# Patient Record
Sex: MALE
[De-identification: names, ages, dates, MRNs, and addresses within clinical notes are randomized per-mention and may not be internally consistent; named-entity substitution may affect disease eponyms.]

---

## 2022-04-07 ENCOUNTER — APPOINTMENT (OUTPATIENT)
Dept: UROLOGY | Facility: CLINIC | Age: 62
End: 2022-04-07
Payer: COMMERCIAL

## 2022-04-07 DIAGNOSIS — F17.210 NICOTINE DEPENDENCE, CIGARETTES, UNCOMPLICATED: ICD-10-CM

## 2022-04-07 DIAGNOSIS — Z87.2 PERSONAL HISTORY OF DISEASES OF THE SKIN AND SUBCUTANEOUS TISSUE: ICD-10-CM

## 2022-04-07 PROBLEM — Z00.00 ENCOUNTER FOR PREVENTIVE HEALTH EXAMINATION: Status: ACTIVE | Noted: 2022-04-07

## 2022-04-07 PROCEDURE — 99204 OFFICE O/P NEW MOD 45 MIN: CPT

## 2022-04-07 NOTE — ASSESSMENT
[FreeTextEntry1] : We had a long discussion regarding PSA level. Different parameters including PSA velocity, age-adjusted PSA, free PSA, etc reviewed. Some studies have indicated that the absolute PSA level may be more accurate than the other parameters listed above. We discussed observation and repeat PSA, prostate biopsy and prostate or pelvic MRI. The false negative rate of MRI was discussed. Risks of biopsy discussed included but were not limited to bleeding, sepsis, bacteremia, urinary tract infection, erectile dysfunction, etc. How the procedure is performed and preparation with antibiotics and enema were discussed. Risks of observation and not proceeding with biopsy were reviewed. Patient was made aware that prostate cancer can exist at any PSA level. Potential complications of delayed prostate cancer diagnosis were discussed.\par will get mri to eval for targetable lesion possible fusion bx

## 2022-04-07 NOTE — HISTORY OF PRESENT ILLNESS
[FreeTextEntry1] : Elevated PSA \par Patient is here with an elevated PSA. He has no personal history and no family history of prostate cancer.He has no prior genitourinary history of hematuria, hematospermia, prostatitis, UTI,, urolithiasis, epididymal orchitis. \par C/o frequency nocturia x3 slow flow but empties well \par No dysuria or hematuria\par erections ok but not active/interested\par psa 9.75\par

## 2022-05-18 ENCOUNTER — APPOINTMENT (OUTPATIENT)
Dept: UROLOGY | Facility: CLINIC | Age: 62
End: 2022-05-18
Payer: COMMERCIAL

## 2022-05-18 PROCEDURE — 99213 OFFICE O/P EST LOW 20 MIN: CPT

## 2022-05-31 NOTE — HISTORY OF PRESENT ILLNESS
[FreeTextEntry1] : Elevated PSA \par Patient is here with an elevated PSA. He has no personal history and no family history of prostate cancer.He has no prior genitourinary history of hematuria, hematospermia, prostatitis, UTI,, urolithiasis, epididymal orchitis. \par C/o frequency nocturia x3 slow flow but empties well \par No dysuria or hematuria\par erections ok but not active/interested\par psa 9.75\par \par \par mri pirad 1 99 cc prostate

## 2022-05-31 NOTE — ASSESSMENT
[FreeTextEntry1] : mri reviewed \par pirad 1 99 cc prostate \par clinically significant prostate very unlikley \par low psa density as well\par will observe

## 2024-05-17 ENCOUNTER — APPOINTMENT (OUTPATIENT)
Dept: UROLOGY | Facility: CLINIC | Age: 64
End: 2024-05-17
Payer: MEDICARE

## 2024-05-17 VITALS
WEIGHT: 165 LBS | HEART RATE: 67 BPM | DIASTOLIC BLOOD PRESSURE: 80 MMHG | BODY MASS INDEX: 23.62 KG/M2 | OXYGEN SATURATION: 95 % | HEIGHT: 70 IN | TEMPERATURE: 97.4 F | SYSTOLIC BLOOD PRESSURE: 150 MMHG

## 2024-05-17 DIAGNOSIS — R97.20 ELEVATED PROSTATE, SPECIFIC ANTIGEN [PSA]: ICD-10-CM

## 2024-05-17 PROCEDURE — 99204 OFFICE O/P NEW MOD 45 MIN: CPT

## 2024-05-20 PROBLEM — R97.20 ELEVATED PSA: Status: ACTIVE | Noted: 2022-04-07

## 2024-05-20 LAB
ANION GAP SERPL CALC-SCNC: 12 MMOL/L
APPEARANCE: CLEAR
BACTERIA UR CULT: NORMAL
BACTERIA: NEGATIVE /HPF
BILIRUBIN URINE: NEGATIVE
BLOOD URINE: NEGATIVE
BUN SERPL-MCNC: 18 MG/DL
CALCIUM SERPL-MCNC: 9.3 MG/DL
CAST: 1 /LPF
CHLORIDE SERPL-SCNC: 106 MMOL/L
CO2 SERPL-SCNC: 27 MMOL/L
COLOR: YELLOW
CREAT SERPL-MCNC: 1.11 MG/DL
EGFR: 73 ML/MIN/1.73M2
EPITHELIAL CELLS: 5 /HPF
GLUCOSE QUALITATIVE U: NEGATIVE MG/DL
GLUCOSE SERPL-MCNC: 86 MG/DL
KETONES URINE: NEGATIVE MG/DL
LEUKOCYTE ESTERASE URINE: NEGATIVE
MICROSCOPIC-UA: NORMAL
NITRITE URINE: NEGATIVE
PH URINE: 5.5
POTASSIUM SERPL-SCNC: 4.5 MMOL/L
PROTEIN URINE: NEGATIVE MG/DL
PSA SERPL-MCNC: 8.83 NG/ML
RED BLOOD CELLS URINE: 1 /HPF
SODIUM SERPL-SCNC: 145 MMOL/L
SPECIFIC GRAVITY URINE: 1.02
UROBILINOGEN URINE: 0.2 MG/DL
WHITE BLOOD CELLS URINE: 5 /HPF

## 2024-05-20 NOTE — HISTORY OF PRESENT ILLNESS
[FreeTextEntry1] : 05/18/2022 Elevated PSA  Patient is here with an elevated PSA. He has no personal history and no family history of prostate cancer.He has no prior genitourinary history of hematuria, hematospermia, prostatitis, UTI,, urolithiasis, epididymal orchitis.  C/o frequency nocturia x3 slow flow but empties well  No dysuria or hematuria  erections ok but not active/interested  psa 9.75  mri pirad 1 99 cc prostate   05/17/2024 cc hospital f/u   64 year old male presents for a hospital f/u. Pt reports he was in the hospital on 05/16/24 for severe pain in lower back. He said he had a CT scan and the pain completely resolved after scan was done. Pt reports he is not taking any medication and hasn't picked up medication from hospital. He denies having bloodwork done or a catheter inserted at the hospital. Pt reports he was told that his urine is backed up into his kidneys. He denies having anything to drink since hospital visit. Pt reports frequency but denies any other problems with urination.  05/16/24 Abdominal and Pelvic CT Conclusion: Prostate hypertrophy and likely bladder outlet obstruction with distended bladder and right greater than left hydroureteronephrosis. Ascending infection on the right is possible with this appearance.

## 2024-05-20 NOTE — END OF VISIT
[FreeTextEntry4] : This note was written by Janet Lynne on 05/17/2024 actively solely Juvencio Mercer M.D. I, Janet Lynne, am scribing for and in the presence of Juvencio Mercer M.D. in the following sections HISTORY OF PRESENT ILLNESS, PAST MEDICAL/FAMILY/SOCIAL HISTORY; REVIEW OF SYSTEMS; VITAL SIGNS; PHYSICAL EXAM; ASSESSMENT/PLAN.   All medical record entries made by this scribe at my, Juvencio Mercer M.D. direction and personally dictated by me on 05/17/2024. I personally performed the services described in the documentation, reviewed the documentation recorded by the scribe in my presence, and it accurately and completely records my words and actions.

## 2024-05-20 NOTE — ASSESSMENT
[FreeTextEntry1] : 64 year old male with hospital f/u. Reviewed CT images with pt. Prostate hypertrophy and likely bladder outlet obstruction with distended bladder and right greater than left hydroureteronephrosis. Ascending infection on the right is possible with this appearance.  Informed pt he has a distended bladder. Advised pt to start Tamsulosin.  PVR done today. Residual 300 cc. PSA and BMP drawn today. Urinalysis and culture done today. Rx for Tamsulosin given today. RTO in 1 week to assess bladder and kidneys.

## 2024-05-24 ENCOUNTER — APPOINTMENT (OUTPATIENT)
Dept: UROLOGY | Facility: CLINIC | Age: 64
End: 2024-05-24
Payer: MEDICARE

## 2024-05-24 VITALS
OXYGEN SATURATION: 95 % | HEIGHT: 70 IN | DIASTOLIC BLOOD PRESSURE: 85 MMHG | WEIGHT: 165 LBS | TEMPERATURE: 97.3 F | SYSTOLIC BLOOD PRESSURE: 135 MMHG | HEART RATE: 82 BPM | BODY MASS INDEX: 23.62 KG/M2

## 2024-05-24 DIAGNOSIS — N40.1 BENIGN PROSTATIC HYPERPLASIA WITH LOWER URINARY TRACT SYMPMS: ICD-10-CM

## 2024-05-24 DIAGNOSIS — N13.8 BENIGN PROSTATIC HYPERPLASIA WITH LOWER URINARY TRACT SYMPMS: ICD-10-CM

## 2024-05-24 PROCEDURE — 76775 US EXAM ABDO BACK WALL LIM: CPT

## 2024-05-24 PROCEDURE — 99213 OFFICE O/P EST LOW 20 MIN: CPT

## 2024-05-30 PROBLEM — N40.1 BPH WITH URINARY OBSTRUCTION: Status: ACTIVE | Noted: 2024-05-17

## 2024-05-30 NOTE — HISTORY OF PRESENT ILLNESS
[FreeTextEntry1] : 05/18/2022 Elevated PSA  Patient is here with an elevated PSA. He has no personal history and no family history of prostate cancer.He has no prior genitourinary history of hematuria, hematospermia, prostatitis, UTI,, urolithiasis, epididymal orchitis.  C/o frequency nocturia x3 slow flow but empties well  No dysuria or hematuria  erections ok but not active/interested  psa 9.75  mri pirad 1 99 cc prostate   05/17/2024 cc hospital f/u  64 year old male presents for a hospital f/u. Pt reports he was in the hospital on 05/16/24 for severe pain in lower back. He said he had a CT scan and the pain completely resolved after scan was done. Pt reports he is not taking any medication and hasn't picked up medication from hospital. He denies having bloodwork done or a catheter inserted at the hospital. Pt reports he was told that his urine is backed up into his kidneys. He denies having anything to drink since hospital visit. Pt reports frequency but denies any other problems with urination.  05/16/24 Abdominal and Pelvic CT Conclusion: Prostate hypertrophy and likely bladder outlet obstruction with distended bladder and right greater than left hydroureteronephrosis. Ascending infection on the right is possible with this appearance.   05/24/2024 cc review U/S results  64 year old male presents to review U/S results. Pt is taking Tamsulosin and states his sx have improved.    05/24/24 Renal U/S Findings: Right kidney demonstrates mild hydronephrosis. Left kidney presents with mild pelvic fullness. Both kidneys are normal in size and echogenicity without stones or solid masses visualized.

## 2024-05-30 NOTE — END OF VISIT
[FreeTextEntry4] : This note was written by Janet Lynne on 05/24/2024 actively solely Juvencio Mercer M.D. I, Janet Lynne, am scribing for and in the presence of Juvencio Mercer M.D. in the following sections HISTORY OF PRESENT ILLNESS, PAST MEDICAL/FAMILY/SOCIAL HISTORY; REVIEW OF SYSTEMS; VITAL SIGNS; PHYSICAL EXAM; ASSESSMENT/PLAN.   All medical record entries made by this scribe at my, Juvencio Mercer M.D. direction and personally dictated by me on 05/24/2024. I personally performed the services described in the documentation, reviewed the documentation recorded by the scribe in my presence, and it accurately and completely records my words and actions.

## 2024-05-30 NOTE — ASSESSMENT
[FreeTextEntry1] : 64 year old male with review of U/S results. Reviewed 05/24/24 Renal U/S images with pt. Right kidney demonstrates mild hydronephrosis. Left kidney presents with mild pelvic fullness. Both kidneys are normal in size and echogenicity without stones or solid masses visualized.   Informed pt his bladder is still distended but it has gotten a little better. Also informed pt his kidney functions are fine. Discussed possibility of outlet procedure of prostate and possible risks if nothing is done, including kidney function worsening. Also discussed adding Finasteride and seeing if that helps while considering surgery. Pt expressed concerns of prostate procedure. Reviewed prostate surgery and possible risks, including decreased ejaculation. Advised pt to strongly consider procedure and in the meantime start Finasteride. Reviewed Finasteride and possible side effects.   PVR done today. Residual 300 cc. Will continue Tamsulosin and start Finasteride. Rx for Finasteride given today. RTO in 1 month for follow up visit.

## 2024-06-04 ENCOUNTER — TRANSCRIPTION ENCOUNTER (OUTPATIENT)
Age: 64
End: 2024-06-04

## 2024-06-26 ENCOUNTER — APPOINTMENT (OUTPATIENT)
Dept: UROLOGY | Facility: CLINIC | Age: 64
End: 2024-06-26

## 2024-06-26 VITALS
HEIGHT: 70 IN | BODY MASS INDEX: 24.34 KG/M2 | OXYGEN SATURATION: 95 % | SYSTOLIC BLOOD PRESSURE: 119 MMHG | HEART RATE: 78 BPM | WEIGHT: 170 LBS | TEMPERATURE: 97.1 F | DIASTOLIC BLOOD PRESSURE: 79 MMHG

## 2024-06-26 LAB
ANION GAP SERPL CALC-SCNC: 10 MMOL/L
BUN SERPL-MCNC: 14 MG/DL
CALCIUM SERPL-MCNC: 9.5 MG/DL
CHLORIDE SERPL-SCNC: 103 MMOL/L
CO2 SERPL-SCNC: 27 MMOL/L
CREAT SERPL-MCNC: 0.86 MG/DL
EGFR: 96 ML/MIN/1.73M2
GLUCOSE SERPL-MCNC: 84 MG/DL
POTASSIUM SERPL-SCNC: 4.6 MMOL/L
SODIUM SERPL-SCNC: 141 MMOL/L

## 2024-06-26 PROCEDURE — 99213 OFFICE O/P EST LOW 20 MIN: CPT

## 2024-06-26 PROCEDURE — G2211 COMPLEX E/M VISIT ADD ON: CPT

## 2024-08-23 ENCOUNTER — RX RENEWAL (OUTPATIENT)
Age: 64
End: 2024-08-23

## 2024-10-02 ENCOUNTER — APPOINTMENT (OUTPATIENT)
Dept: UROLOGY | Facility: CLINIC | Age: 64
End: 2024-10-02
Payer: MEDICARE

## 2024-10-02 VITALS
OXYGEN SATURATION: 96 % | TEMPERATURE: 96.8 F | HEART RATE: 67 BPM | DIASTOLIC BLOOD PRESSURE: 83 MMHG | BODY MASS INDEX: 24.34 KG/M2 | WEIGHT: 170 LBS | HEIGHT: 70 IN | SYSTOLIC BLOOD PRESSURE: 125 MMHG

## 2024-10-02 DIAGNOSIS — N13.8 BENIGN PROSTATIC HYPERPLASIA WITH LOWER URINARY TRACT SYMPMS: ICD-10-CM

## 2024-10-02 DIAGNOSIS — N39.44 NOCTURNAL ENURESIS: ICD-10-CM

## 2024-10-02 DIAGNOSIS — R97.20 ELEVATED PROSTATE, SPECIFIC ANTIGEN [PSA]: ICD-10-CM

## 2024-10-02 DIAGNOSIS — N40.1 BENIGN PROSTATIC HYPERPLASIA WITH LOWER URINARY TRACT SYMPMS: ICD-10-CM

## 2024-10-02 PROCEDURE — 99214 OFFICE O/P EST MOD 30 MIN: CPT

## 2024-10-02 PROCEDURE — G2211 COMPLEX E/M VISIT ADD ON: CPT

## 2024-10-02 PROCEDURE — 51798 US URINE CAPACITY MEASURE: CPT

## 2024-10-07 PROBLEM — N39.44 PRIMARY NOCTURNAL ENURESIS: Status: ACTIVE | Noted: 2024-10-07

## 2024-10-07 LAB
ANION GAP SERPL CALC-SCNC: 15 MMOL/L
APPEARANCE: CLEAR
BACTERIA UR CULT: NORMAL
BACTERIA: NEGATIVE /HPF
BILIRUBIN URINE: NEGATIVE
BLOOD URINE: NEGATIVE
BUN SERPL-MCNC: 11 MG/DL
CALCIUM SERPL-MCNC: 9.5 MG/DL
CAST: 1 /LPF
CHLORIDE SERPL-SCNC: 102 MMOL/L
CO2 SERPL-SCNC: 22 MMOL/L
COLOR: YELLOW
CREAT SERPL-MCNC: 0.87 MG/DL
EGFR: 95 ML/MIN/1.73M2
EPITHELIAL CELLS: 0 /HPF
GLUCOSE QUALITATIVE U: NEGATIVE MG/DL
GLUCOSE SERPL-MCNC: 90 MG/DL
KETONES URINE: NEGATIVE MG/DL
LEUKOCYTE ESTERASE URINE: NEGATIVE
MICROSCOPIC-UA: NORMAL
NITRITE URINE: NEGATIVE
PH URINE: 6
POTASSIUM SERPL-SCNC: 4.7 MMOL/L
PROTEIN URINE: NEGATIVE MG/DL
PSA SERPL-MCNC: 8.19 NG/ML
RED BLOOD CELLS URINE: 0 /HPF
REVIEW: NORMAL
SODIUM SERPL-SCNC: 140 MMOL/L
SPECIFIC GRAVITY URINE: 1.02
UROBILINOGEN URINE: 0.2 MG/DL
WHITE BLOOD CELLS URINE: 0 /HPF

## 2024-10-07 NOTE — END OF VISIT
[FreeTextEntry4] : This note was written by Genesis Verdin on 10/02/2024 actively solely Juvencio Mercer M.D. I, Genesis Verdin, am scribing for and in the presence of Juvencio Mercer M.D. in the following sections HISTORY OF PRESENT ILLNESS, PAST MEDICAL/FAMILY/SOCIAL HISTORY; REVIEW OF SYSTEMS; VITAL SIGNS; PHYSICAL EXAM; ASSESSMENT/PLAN. All medical record entries made by this scribe at , Juvencio Mercer M.D. direction and personally dictated by me on 10/02/2024. I personally performed the services described in the documentation, reviewed the documentation recorded by the scribe in my presence, and it accurately and completely records my words and actions.

## 2024-10-07 NOTE — HISTORY OF PRESENT ILLNESS
[FreeTextEntry1] : 05/18/2022 Elevated PSA  Patient is here with an elevated PSA. He has no personal history and no family history of prostate cancer.He has no prior genitourinary history of hematuria, hematospermia, prostatitis, UTI,, urolithiasis, epididymal orchitis.  C/o frequency nocturia x3 slow flow but empties well No dysuria or hematuria erections ok but not active/interested psa 9.75 mri pirad 1 99 cc prostate   05/17/2024 cc hospital f/u  64 year old male presents for a hospital f/u. Pt reports he was in the hospital on 05/16/24 for severe pain in lower back. He said he had a CT scan and the pain completely resolved after scan was done. Pt reports he is not taking any medication and hasn't picked up medication from hospital. He denies having bloodwork done or a catheter inserted at the hospital. Pt reports he was told that his urine is backed up into his kidneys. He denies having anything to drink since hospital visit. Pt reports frequency but denies any other problems with urination.  05/16/24 Abdominal and Pelvic CT Conclusion: Prostate hypertrophy and likely bladder outlet obstruction with distended bladder and right greater than left hydroureteronephrosis. Ascending infection on the right is possible with this appearance.   05/24/2024 cc review U/S results  64 year old male presents to review U/S results. Pt is taking Tamsulosin and states his sx have improved.  05/24/24 Renal U/S Findings: Right kidney demonstrates mild hydronephrosis. Left kidney presents with mild pelvic fullness. Both kidneys are normal in size and echogenicity without stones or solid masses visualized.  06/26/2024 cc:f/u visit  64 year old male presents with f/u visit. He reports his urination is better and the flow is more?. He reports he went to the hospital because of kidney pain, vomiting wasn't able to keep still and was experiencing pain. He states the hospital said the urine was backing up into the kidney was given morphine. He states the morphine helped and after taken for the MRI the pain was gone and was told to go see a urologist.  10/02/2024 cc: f/u visit with c/o nocturnal enuresis  Pt is a 64 year old male presenting for a f/u visit with c/o nocturnal enuresis. Pt reports that within the last week (09/22/2024-09/28/2024) he has urinated during his sleep around 2-3 times. Pt denies any other urinary symptoms. Pt requested refills for his medications.

## 2024-10-07 NOTE — ASSESSMENT
[FreeTextEntry1] : Pt is a 64 year old male with a f/u visit. UA and culture done today  pvr slightlly improved 250  cont finasteride and tamsulosin  reduce fluid and caffeine in evening  Rx Finasteride  Rx Tamsulosin  repeat psa  if rising will get mri to eval for fusion target bc     Patient is being seen today for evaluation and management of a chronic and longitudinal ongoing condition and I am of the primary treating physician.

## 2024-10-28 ENCOUNTER — RX RENEWAL (OUTPATIENT)
Age: 64
End: 2024-10-28

## 2025-04-09 ENCOUNTER — APPOINTMENT (OUTPATIENT)
Dept: UROLOGY | Facility: CLINIC | Age: 65
End: 2025-04-09
Payer: MEDICARE

## 2025-04-09 VITALS
OXYGEN SATURATION: 95 % | SYSTOLIC BLOOD PRESSURE: 125 MMHG | TEMPERATURE: 98.8 F | HEIGHT: 70 IN | HEART RATE: 103 BPM | WEIGHT: 170 LBS | DIASTOLIC BLOOD PRESSURE: 82 MMHG | BODY MASS INDEX: 24.34 KG/M2

## 2025-04-09 DIAGNOSIS — N40.1 BENIGN PROSTATIC HYPERPLASIA WITH LOWER URINARY TRACT SYMPMS: ICD-10-CM

## 2025-04-09 DIAGNOSIS — R97.20 ELEVATED PROSTATE, SPECIFIC ANTIGEN [PSA]: ICD-10-CM

## 2025-04-09 DIAGNOSIS — N13.8 BENIGN PROSTATIC HYPERPLASIA WITH LOWER URINARY TRACT SYMPMS: ICD-10-CM

## 2025-04-09 PROCEDURE — 99214 OFFICE O/P EST MOD 30 MIN: CPT

## 2025-04-09 PROCEDURE — 51798 US URINE CAPACITY MEASURE: CPT

## 2025-04-09 PROCEDURE — G2211 COMPLEX E/M VISIT ADD ON: CPT

## 2025-04-10 LAB
ANION GAP SERPL CALC-SCNC: 12 MMOL/L
APPEARANCE: CLEAR
BACTERIA: NEGATIVE /HPF
BILIRUBIN URINE: NEGATIVE
BLOOD URINE: NEGATIVE
BUN SERPL-MCNC: 14 MG/DL
CALCIUM SERPL-MCNC: 9.3 MG/DL
CAST: 0 /LPF
CHLORIDE SERPL-SCNC: 102 MMOL/L
CO2 SERPL-SCNC: 26 MMOL/L
COLOR: YELLOW
CREAT SERPL-MCNC: 1 MG/DL
EGFRCR SERPLBLD CKD-EPI 2021: 82 ML/MIN/1.73M2
EPITHELIAL CELLS: 0 /HPF
GLUCOSE QUALITATIVE U: NEGATIVE MG/DL
GLUCOSE SERPL-MCNC: 95 MG/DL
KETONES URINE: NEGATIVE MG/DL
LEUKOCYTE ESTERASE URINE: NEGATIVE
MICROSCOPIC-UA: NORMAL
NITRITE URINE: NEGATIVE
PH URINE: 6
POTASSIUM SERPL-SCNC: 4.7 MMOL/L
PROTEIN URINE: NEGATIVE MG/DL
PSA SERPL-MCNC: 8.37 NG/ML
RED BLOOD CELLS URINE: 0 /HPF
SODIUM SERPL-SCNC: 141 MMOL/L
SPECIFIC GRAVITY URINE: 1.02
UROBILINOGEN URINE: 0.2 MG/DL
WHITE BLOOD CELLS URINE: 1 /HPF

## 2025-04-11 LAB — BACTERIA UR CULT: NORMAL

## 2025-06-30 ENCOUNTER — RX RENEWAL (OUTPATIENT)
Age: 65
End: 2025-06-30